# Patient Record
Sex: MALE | Race: BLACK OR AFRICAN AMERICAN
[De-identification: names, ages, dates, MRNs, and addresses within clinical notes are randomized per-mention and may not be internally consistent; named-entity substitution may affect disease eponyms.]

---

## 2019-08-25 ENCOUNTER — HOSPITAL ENCOUNTER (EMERGENCY)
Dept: HOSPITAL 88 - FSED | Age: 44
Discharge: HOME | End: 2019-08-25
Payer: COMMERCIAL

## 2019-08-25 VITALS — BODY MASS INDEX: 39.76 KG/M2 | HEIGHT: 73 IN | WEIGHT: 300 LBS
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
## 2019-08-25 DIAGNOSIS — S43.422A: Primary | ICD-10-CM

## 2019-08-25 DIAGNOSIS — I10: ICD-10-CM

## 2019-08-25 PROCEDURE — 99283 EMERGENCY DEPT VISIT LOW MDM: CPT

## 2019-08-25 PROCEDURE — 93005 ELECTROCARDIOGRAM TRACING: CPT

## 2019-08-25 NOTE — DIAGNOSTIC IMAGING REPORT
EXAMINATION: Left shoulder series.



CLINICAL HISTORY: Left arm pain.



COMPARISON: None.  .



Discussion:  The osseous structures are intact without evidence of acute,

displaced  fracture or dislocation.  No osteolytic or osteoblastic lesions.

There is no evidence of a.c. separation.  The glenohumeral joint is within

normal limits. The soft tissues are normal.



IMPRESSION: 

 

1. No acute abnormalities.



Signed by: Dr. Leo Chinchilla M.D. on 8/25/2019 6:12 PM